# Patient Record
(demographics unavailable — no encounter records)

---

## 2024-10-09 NOTE — HISTORY OF PRESENT ILLNESS
[FreeTextEntry1] : KALEN MALDONADO is here for follow up. Since last visit she had updated MRI C spine. She is reporting neck pains and bilateral hand numbness. Does have hx of carpal tunnel syndrome. Wishing to go back to therapy for her symptoms as it was very helpful in the past.   Denies any new pain, numbness or weakness, bowel/bladder dysfunction, saddle anesthesia, fevers, chills, weight loss, night pain, or night sweats at this time.

## 2024-10-09 NOTE — ASSESSMENT
[FreeTextEntry1] : Ms. KALEN MALDONADO is a 87 year F with pain in the neck ACROSS with radiation down the arms due to cervical radiculopathy. She also has hx of myelopathy, and also spondylosis. Bilateral hand numbness due to CTS.  Patient reassured and educated on the diagnosis and treatment options. Risks and benefits of treatment and of delaying treatment discussed with patient. Risks discussed include but not limited to: progression of symptoms, worsening pain and functional status, etc.  This note was generated using Dragon medical dictation software. A reasonable effort had been made for proofreading its contents, but spelling mistakes or grammatical errors may still remain. If there are any questions or points of clarification needed please notify my office.  Chart reviewed Dr. Mae's note reviewed MRI C spine from 2022 reviewed  MRI w/o contrast of CERVICAL SPINE 2024 reviewed: "Multilevel discogenic degenerative disease and facet arthropathy of the cervical spine resulting in mild to moderate central canal and neural foraminal narrowing, as above. These findings are not significantly changed as compared to 6/29/2022."  "The partially evaluated aortic arch demonstrates enlargement, measuring at least 3 cm." Advising to follow up with PCP  Rx for OT and PT for CTS and neck pains Trigger point PRN  Follow up 4 weeks  Patient was advised if the following symptoms develop: chills, fever, loss of bladder control, bowel incontinence or urinary retention, numbness/tingling or weakness is present in upper or lower extremities, to go to the nearest emergency room. This may be a new clinical condition not present at the time of the patient visit that may lead to paralysis and/or death. Patient advised if the above symptoms developed to also call the office immediately to inform us and to go to the nearest emergency room.

## 2024-10-30 NOTE — HISTORY OF PRESENT ILLNESS
[FreeTextEntry1] : KALEN MALDONADO is here for follow up. Since last visit started PT and OT and notes improvement in overall symptoms. Asking to continue therapy. Also noting some point tenderness over the LEFT gluteal area. Pain is worse with laying on the side.   Denies any new pain, numbness or weakness, bowel/bladder dysfunction, saddle anesthesia, fevers, chills, weight loss, night pain, or night sweats at this time.

## 2024-10-30 NOTE — ASSESSMENT
[FreeTextEntry1] : Ms. KALEN MALDONADO is a 88 year F with pain in the neck ACROSS with radiation down the arms due to cervical radiculopathy. She also has hx of myelopathy, and also spondylosis. Bilateral hand numbness due to CTS. Today with left gluteal trigger points due to myofascial pain.  Patient reassured and educated on the diagnosis and treatment options. Risks and benefits of treatment and of delaying treatment discussed with patient. Risks discussed include but not limited to: progression of symptoms, worsening pain and functional status, etc.  This note was generated using Dragon medical dictation software. A reasonable effort had been made for proofreading its contents, but spelling mistakes or grammatical errors may still remain. If there are any questions or points of clarification needed please notify my office.   MRI w/o contrast of CERVICAL SPINE 2024: "Multilevel discogenic degenerative disease and facet arthropathy of the cervical spine resulting in mild to moderate central canal and neural foraminal narrowing, as above. These findings are not significantly changed as compared to 6/29/2022."  "The partially evaluated aortic arch demonstrates enlargement, measuring at least 3 cm." Advised last visit to follow up with PCP  Rx for OT and PT for CTS and neck pains Trigger point PRN Rx for voltaren gel  Start Medrol 4mg dose pack #21 tablets. Patient directed to follow directions on the blister pack and to complete the entire treatment course. Advised not to take any NSAIDs during of treatment. Denies CKD, CAD, or gastritis. Recommend that if patient develops GI symptoms including abdominal pain, nausea, or vomiting to discontinue use of medication immediately.  Follow up 4 weeks  Patient was advised if the following symptoms develop: chills, fever, loss of bladder control, bowel incontinence or urinary retention, numbness/tingling or weakness is present in upper or lower extremities, to go to the nearest emergency room. This may be a new clinical condition not present at the time of the patient visit that may lead to paralysis and/or death. Patient advised if the above symptoms developed to also call the office immediately to inform us and to go to the nearest emergency room.

## 2024-10-30 NOTE — HISTORY OF PRESENT ILLNESS
[FreeTextEntry1] : KALEN MALDONADO is here for follow up. Since last visit started PT and OT and notes improvement in overall symptoms. Asking to continue therapy. Also noting some point tenderness over the LEFT gluteal area. Pain is worse with laying on the side.   Denies any new pain, numbness or weakness, bowel/bladder dysfunction, saddle anesthesia, fevers, chills, weight loss, night pain, or night sweats at this time. Finger fracture, right, closed, initial encounter

## 2024-10-30 NOTE — PHYSICAL EXAM
[FreeTextEntry1] : General exam   Constitutional: The patient appears well-developed, well-nourished, and in no apparent distress. Patient is well-groomed.    Skin: The skin is warm and dry, with normal turgor.  Eyes: PERRL.    ENMT: Ears: Hearing is grossly within normal limits.    Neck: Supple: The neck is supple.    Respiratory: Inspection: Breathing unlabored.    Neurologic: Alert and oriented x 3.   Psychiatric: Patient is cooperative and appropriate.  Mood and affect are normal.  Patient's insight is good, and memory and judgment are intact.  Lumbar Skin c/d/i without any erythema, swelling, effusion  Point tenderness over LEFT gluteal area Multiple trigger points

## 2025-04-07 NOTE — ADDENDUM
[FreeTextEntry1] : Documented by Melanie Maki acting as a scribe for Dr. Sosa Gómez. 04/07/2025  All medical records entries made by the scribe were at my, Dr. Gómez, direction and personally dictated by me on 04/07/2025. I have reviewed the chart and agree that the record accurately reflects my personal performance of the history, physical exam, assessment and plan. I have also personally directed, reviewed, and agreed with the chart.

## 2025-04-07 NOTE — ASSESSMENT
[Vaccines Reviewed] : Immunizations reviewed today. Please see immunization details in the vaccine log within the immunization flowsheet.  [FreeTextEntry1] : annual physical  referred for mammogram, bone density scan  urinary incontinence we'll check UA and urine culture today referred to urology  Forgetful referred to neurology on last visit  Osteoarthritis, chronic neck and back pain (cervical myelopathy/myopathy and stenosis) cont Gabapentin 100mg qhs prn following with ortho referred to PM&R  Hx of anxiety and depression start Escitalopram 5mg daily - renewed today  Elevated cholesterol Discussed the importance of following a low cholesterol/low fat diet/exercise we'll check lipids and LFT's today   bloodwork ordered follow up in one week for lab results

## 2025-04-07 NOTE — HISTORY OF PRESENT ILLNESS
[de-identified] :  Ms. KALEN MALDONADO is a 88 year old female with hx of breast CA, osteoarthritis, hyperparathyroidism, chronic neck and back pain, and anxiety, presenting for a follow up visit.  Pt c/o bladder incontinence. Pt c/o ankle swelling at night. Denies any SOB, CP, abdominal pain, N/V/D, headache, dizziness, or leg swelling.  Reports compliance with taking her meds daily.

## 2025-04-07 NOTE — HEALTH RISK ASSESSMENT
[Fair] :  ~his/her~ mood as fair [No] : No [No falls in past year] : Patient reported no falls in the past year [0] : 1) Little interest or pleasure doing things: Not at all (0) [1] : 2) Feeling down, depressed, or hopeless for several days (1) [PHQ-2 Negative - No further assessment needed] : PHQ-2 Negative - No further assessment needed [Yes] : takes [Never] : Never [NO] : No [Patient reported mammogram was normal] : Patient reported mammogram was normal [Patient reported colonoscopy was normal] : Patient reported colonoscopy was normal [With Family] : lives with family [# of Members in Household ___] :  household currently consist of [unfilled] member(s) [Retired] : retired [High School] : high school [] :  [Feels Safe at Home] : Feels safe at home [Independent] : managing finances [Some assistance needed] : preparing meals [Full assistance needed] : housekeeping [Smoke Detector] : smoke detector [Carbon Monoxide Detector] : carbon monoxide detector [Safety elements used in home] : safety elements used in home [Seat Belt] :  uses seat belt [Sunscreen] : uses sunscreen [de-identified] : Maintains active by going in the pool daily. [de-identified] : Maintains a healthy balanced diet.  [KEY2Umovf] : 1 [Sexually Active] : not sexually active [Reports changes in hearing] : Reports no changes in hearing [Reports changes in vision] : Reports no changes in vision [Reports changes in dental health] : Reports no changes in dental health [Travel to Developing Areas] : does not  travel to developing areas [TB Exposure] : is not being exposed to tuberculosis [Caregiver Concerns] : does not have caregiver concerns [MammogramDate] : 07/2022 [BoneDensityDate] : "a while ago" [ColonoscopyDate] : 08/2019 [de-identified] : Dx. with Macular degeneration 2-3 yrs ago, takes eye drops and vitamins for the past 2-3 years.

## 2025-04-07 NOTE — REVIEW OF SYSTEMS
[Lower Ext Edema] : lower extremity edema [Incontinence] : incontinence [Back Pain] : back pain [Negative] : Heme/Lymph [FreeTextEntry9] : chronic neck and back pain [de-identified] : forgetfulness

## 2025-04-07 NOTE — PHYSICAL EXAM
[No Acute Distress] : no acute distress [Well-Appearing] : well-appearing [Normal Sclera/Conjunctiva] : normal sclera/conjunctiva [PERRL] : pupils equal round and reactive to light [EOMI] : extraocular movements intact [Normal Outer Ear/Nose] : the outer ears and nose were normal in appearance [Normal Oropharynx] : the oropharynx was normal [No JVD] : no jugular venous distention [No Lymphadenopathy] : no lymphadenopathy [Supple] : supple [No Respiratory Distress] : no respiratory distress  [No Accessory Muscle Use] : no accessory muscle use [Clear to Auscultation] : lungs were clear to auscultation bilaterally [Normal Rate] : normal rate  [Regular Rhythm] : with a regular rhythm [Normal S1, S2] : normal S1 and S2 [No Murmur] : no murmur heard [No Edema] : there was no peripheral edema [Soft] : abdomen soft [Non Tender] : non-tender [Non-distended] : non-distended [No Masses] : no abdominal mass palpated [No HSM] : no HSM [Normal Bowel Sounds] : normal bowel sounds [Normal Posterior Cervical Nodes] : no posterior cervical lymphadenopathy [Normal Anterior Cervical Nodes] : no anterior cervical lymphadenopathy [No CVA Tenderness] : no CVA  tenderness [No Spinal Tenderness] : no spinal tenderness [No Joint Swelling] : no joint swelling [No Rash] : no rash [Normal Affect] : the affect was normal [Normal Insight/Judgement] : insight and judgment were intact [de-identified] : + chronic neck pain [de-identified] : + chronic neck and back pain [de-identified] :  ambulates with walker

## 2025-05-06 NOTE — HISTORY OF PRESENT ILLNESS
[de-identified] : 88-year-old female presents today with left shoulder pain x 3 weeks. No injury reported. The pain is constant and worse with movement of the shoulder. She takes Tylenol with some relief. She also complains of radiating pain to the hand. Patient also c/o loss of motion. Patient uses walker for ambulation.  s/p right shoulder arthroplasty in 2012.

## 2025-05-06 NOTE — HISTORY OF PRESENT ILLNESS
[de-identified] : 88-year-old female presents today with left shoulder pain x 3 weeks. No injury reported. The pain is constant and worse with movement of the shoulder. She takes Tylenol with some relief. She also complains of radiating pain to the hand. Patient also c/o loss of motion. Patient uses walker for ambulation.  s/p right shoulder arthroplasty in 2012.

## 2025-05-06 NOTE — ADDENDUM
[FreeTextEntry1] : This note was written by Evelyn Nation on 04/29/2025 acting solely as a scribe for Dr. Shailesh Goodson.   All medical record entries made by the Scribe were at my, Dr. Shailesh Goodson, direction and personally dictated by me on 04/29/2025. I have personally reviewed the chart and agree that the record accurately reflects my personal performance of the history, physical exam, assessment and plan.

## 2025-05-06 NOTE — DISCUSSION/SUMMARY
[de-identified] : 89 y/o female with left shoulder OA  A discussion was had with the patient regarding degenerative joint disease that results from rotator cuff injury and loss of joint congruence and glenohumeral wear. This is consistent with rotator cuff arthropathy which is characterized by the combination of rotator cuff insufficiency as well as glenohumeral cartilage destruction and superior migration of the humeral head. Discussed that progression is common due to the loss of the compressive effects of the rotator cuff as well as progression of functional decline. Rotator cuff arthropathy tends to limit range of motion as well as pseudoparalysis. Given age of patient and relative recent onset of pain from chronic condition, recommendation is for conservative management. Patient is not a current surgical candidate for TSA.   Recommendation: Begin trial of PT, Rx given. OTC NSAIDs or acetaminophen as tolerated, with application of ice/heat to the area 2-3x daily for 20 minutes after periods of activity.       Follow-up as needed.

## 2025-05-06 NOTE — PHYSICAL EXAM
[de-identified] : Left shoulder exam  Inspection: No malalignment, No defects, No atrophy Skin: No masses, No lesions Neck: Negative Spurling's, full ROM, no pain with ROM AROM: FF to 120, abduction to 80, ER limited, IR to hip Painful arc ROM: none Tenderness: no bicipital tenderness, no tenderness to the greater tuberosity/RTC insertion, no anterior shoulder/lesser tuberosity tenderness Strength: 3/5 ER, 5/5 IR in adduction, 3/5 supraspinatus testing, negative Oxford's test AC Joint: No ttp/pain with cross arm testing Biceps: Speed Negative, Yergusons Negative Impingement test: Negative Aponte, Negative Neer  Stability: Stable Vasc: 2+ radial pulse Neuro: AIN, PIN, Ulnar nerve intact to motor Sensation: Intact to light touch throughout  [de-identified] : The following radiographs were ordered and read by me during this patients visit. I reviewed each radiograph in detail with the patient and discussed the findings as highlighted below.   3 views of the left shoulder were obtained, 04/29/2025, that show no acute fracture or dislocation. There is severe glenohumeral and moderate AC joint degenerative change seen. Type II acromion. There is high riding humeral head. No significant other obvious osseous abnormality, otherwise unremarkable.

## 2025-05-06 NOTE — PHYSICAL EXAM
[de-identified] : Left shoulder exam  Inspection: No malalignment, No defects, No atrophy Skin: No masses, No lesions Neck: Negative Spurling's, full ROM, no pain with ROM AROM: FF to 120, abduction to 80, ER limited, IR to hip Painful arc ROM: none Tenderness: no bicipital tenderness, no tenderness to the greater tuberosity/RTC insertion, no anterior shoulder/lesser tuberosity tenderness Strength: 3/5 ER, 5/5 IR in adduction, 3/5 supraspinatus testing, negative Cannon's test AC Joint: No ttp/pain with cross arm testing Biceps: Speed Negative, Yergusons Negative Impingement test: Negative Aponte, Negative Neer  Stability: Stable Vasc: 2+ radial pulse Neuro: AIN, PIN, Ulnar nerve intact to motor Sensation: Intact to light touch throughout  [de-identified] : The following radiographs were ordered and read by me during this patients visit. I reviewed each radiograph in detail with the patient and discussed the findings as highlighted below.   3 views of the left shoulder were obtained, 04/29/2025, that show no acute fracture or dislocation. There is severe glenohumeral and moderate AC joint degenerative change seen. Type II acromion. There is high riding humeral head. No significant other obvious osseous abnormality, otherwise unremarkable.

## 2025-05-06 NOTE — DISCUSSION/SUMMARY
[de-identified] : 87 y/o female with left shoulder OA  A discussion was had with the patient regarding degenerative joint disease that results from rotator cuff injury and loss of joint congruence and glenohumeral wear. This is consistent with rotator cuff arthropathy which is characterized by the combination of rotator cuff insufficiency as well as glenohumeral cartilage destruction and superior migration of the humeral head. Discussed that progression is common due to the loss of the compressive effects of the rotator cuff as well as progression of functional decline. Rotator cuff arthropathy tends to limit range of motion as well as pseudoparalysis. Given age of patient and relative recent onset of pain from chronic condition, recommendation is for conservative management. Patient is not a current surgical candidate for TSA.   Recommendation: Begin trial of PT, Rx given. OTC NSAIDs or acetaminophen as tolerated, with application of ice/heat to the area 2-3x daily for 20 minutes after periods of activity.       Follow-up as needed.

## 2025-05-22 NOTE — PHYSICAL EXAM
[No Acute Distress] : no acute distress [Well-Appearing] : well-appearing [Normal Sclera/Conjunctiva] : normal sclera/conjunctiva [Normal Outer Ear/Nose] : the outer ears and nose were normal in appearance [No JVD] : no jugular venous distention [No Lymphadenopathy] : no lymphadenopathy [Supple] : supple [No Respiratory Distress] : no respiratory distress  [No Accessory Muscle Use] : no accessory muscle use [Clear to Auscultation] : lungs were clear to auscultation bilaterally [Normal Rate] : normal rate  [Regular Rhythm] : with a regular rhythm [Normal S1, S2] : normal S1 and S2 [No Murmur] : no murmur heard [No Edema] : there was no peripheral edema [No Extremity Clubbing/Cyanosis] : no extremity clubbing/cyanosis [Soft] : abdomen soft [Non Tender] : non-tender [Non-distended] : non-distended [Normal Posterior Cervical Nodes] : no posterior cervical lymphadenopathy [Normal Anterior Cervical Nodes] : no anterior cervical lymphadenopathy [No CVA Tenderness] : no CVA  tenderness [No Joint Swelling] : no joint swelling [No Rash] : no rash [Normal Affect] : the affect was normal [Alert and Oriented x3] : oriented to person, place, and time [Normal Insight/Judgement] : insight and judgment were intact [de-identified] : +b/l cerumen impaction, right > left [de-identified] : + chronic neck pain [de-identified] : + chronic neck and back pain [de-identified] :  ambulates with walker

## 2025-05-22 NOTE — HISTORY OF PRESENT ILLNESS
[de-identified] : Ms. KALEN MALDONADO is a 88 year old female with hx of breast CA, osteoarthritis, hyperparathyroidism, chronic neck and back pain, and anxiety, presenting for a follow up visit.   Pt states she is feeling well. Pt c/o itchiness in her left ear. Denies any SOB, CP, abdominal pain, N/V/D, headache, dizziness, or leg swelling.   Reports compliance with taking her meds daily.

## 2025-05-22 NOTE — PHYSICAL EXAM
[No Acute Distress] : no acute distress [Well-Appearing] : well-appearing [Normal Sclera/Conjunctiva] : normal sclera/conjunctiva [Normal Outer Ear/Nose] : the outer ears and nose were normal in appearance [No JVD] : no jugular venous distention [No Lymphadenopathy] : no lymphadenopathy [Supple] : supple [No Respiratory Distress] : no respiratory distress  [No Accessory Muscle Use] : no accessory muscle use [Clear to Auscultation] : lungs were clear to auscultation bilaterally [Normal Rate] : normal rate  [Regular Rhythm] : with a regular rhythm [Normal S1, S2] : normal S1 and S2 [No Murmur] : no murmur heard [No Edema] : there was no peripheral edema [No Extremity Clubbing/Cyanosis] : no extremity clubbing/cyanosis [Soft] : abdomen soft [Non Tender] : non-tender [Non-distended] : non-distended [Normal Posterior Cervical Nodes] : no posterior cervical lymphadenopathy [Normal Anterior Cervical Nodes] : no anterior cervical lymphadenopathy [No CVA Tenderness] : no CVA  tenderness [No Joint Swelling] : no joint swelling [No Rash] : no rash [Normal Affect] : the affect was normal [Alert and Oriented x3] : oriented to person, place, and time [Normal Insight/Judgement] : insight and judgment were intact [de-identified] : +b/l cerumen impaction, right > left [de-identified] : + chronic neck pain [de-identified] : + chronic neck and back pain [de-identified] :  ambulates with walker

## 2025-05-22 NOTE — REVIEW OF SYSTEMS
[Back Pain] : back pain [Negative] : Heme/Lymph [FreeTextEntry9] : chronic neck and back pain [de-identified] : forgetfulness

## 2025-05-22 NOTE — ASSESSMENT
[Vaccines Reviewed] : Immunizations reviewed today. Please see immunization details in the vaccine log within the immunization flowsheet.  [FreeTextEntry1] : follow up  Forgetful referred to neurology on last visit but hasn't been able to contact them - referral given today  Osteoarthritis, chronic neck and back pain (cervical myelopathy/myopathy and stenosis) cont Gabapentin 100mg qhs prn following with ortho following with PM&R  Hx of anxiety and depression dc Escitalopram 5mg daily start paroxetine 10mg daily - Rx sent to pharmacy  Elevated cholesterol Discussed the importance of following a low cholesterol/low fat diet/exercise we'll check lipids today   bloodwork ordered follow up in one week for lab results

## 2025-05-22 NOTE — ADDENDUM
[FreeTextEntry1] : Documented by Melanie Maki acting as a scribe for Dr. Sosa Gómez. 05/22/2025  All medical records entries made by the scribe were at my, Dr. Gómez, direction and personally dictated by me on 05/22/2025. I have reviewed the chart and agree that the record accurately reflects my personal performance of the history, physical exam, assessment and plan. I have also personally directed, reviewed, and agreed with the chart.

## 2025-05-22 NOTE — HISTORY OF PRESENT ILLNESS
[de-identified] : Ms. KALEN MALDONADO is a 88 year old female with hx of breast CA, osteoarthritis, hyperparathyroidism, chronic neck and back pain, and anxiety, presenting for a follow up visit.   Pt states she is feeling well. Pt c/o itchiness in her left ear. Denies any SOB, CP, abdominal pain, N/V/D, headache, dizziness, or leg swelling.   Reports compliance with taking her meds daily.

## 2025-05-22 NOTE — REVIEW OF SYSTEMS
[Back Pain] : back pain [Negative] : Heme/Lymph [FreeTextEntry9] : chronic neck and back pain [de-identified] : forgetfulness

## 2025-06-02 NOTE — HISTORY OF PRESENT ILLNESS
[FreeTextEntry1] : Very pleasant  88 year old woman who presents for evaluation of urinary urgency, urge incontinence.  She reports that she has been taking an over-the-counter supplement with improvement in her symptoms, however this does not significantly improve her symptoms then she also reports that the cost of the supplements is very expensive.  She wishes to discuss additional options for management of urinary urgency at this time.  She denies leakage of urine with coughing, sneezing, or straining.  Reports a "bladder lift" 55 years ago

## 2025-06-02 NOTE — ASSESSMENT
[FreeTextEntry1] : Very pleasant 88-year-old woman who presents for evaluation of urinary urgency, urge incontinence - We discussed different types of incontinence, including urinary urgency, urge incontinence, and stress incontinence - We discussed different treatment options for urinary urgency and urge incontinence - Trial of mirabegron - We discussed the risks and benefits of mirabegron, including the need to check blood pressure more frequently when for starting the medication -A1c 5.8% -Creatinine 0.81 -WBC 6.21 - Follow-up in 1 month  Patient is being seen today for evaluation and management of a chronic and longitudinal ongoing condition and I am the primary treating physician

## 2025-06-30 NOTE — HISTORY OF PRESENT ILLNESS
[Home] : at home, [unfilled] , at the time of the visit. [Medical Office: ([facility])___] : at the medical office located in  [Telephone (audio)] : This telephonic visit was provided via audio only technology. [Technical] : patient unable to effectively utilize tele-video due to technical issues. [Verbal consent obtained from patient] : the patient, [unfilled] [Telehealth (audio & video)] : This visit was provided via telehealth using real-time 2-way audio visual technology. [de-identified] : Ms. KALEN MALDONADO is a 88 year old female with hx of breast CA, osteoarthritis, hyperparathyroidism, chronic neck and back pain, seen in telemedicine for a follow up visit.   Pt c/o left shoulder pain and limited ROM of left shoulder that started 2 weeks ago. Pt has been taking ibuprofen which has not helped. Denies any SOB, CP, abdominal pain, N/V/D, headache, dizziness, or leg swelling. Reports compliance with taking her meds daily.

## 2025-06-30 NOTE — ASSESSMENT
[Vaccines Reviewed] : Immunizations reviewed today. Please see immunization details in the vaccine log within the immunization flowsheet.  [FreeTextEntry1] : follow up  rt shoulder pain Diclofenac to 75 mg twice daily with food-Rx sent to pharmacy Voltaren gel prn-Rx sent to pharmacy f/u with Ortho  Osteoarthritis, chronic neck and back pain (cervical myelopathy/myopathy and stenosis) cont Gabapentin 100mg qhs prn following with ortho following with PM&R  hx  of urinary incontinence following  with urology --has an appointment on July 7th.  Hx of anxiety and depression cont paroxetine 10mg daily   Elevated cholesterol Reinforced the importance of following a low fat/low cholesterol diet

## 2025-06-30 NOTE — ADDENDUM
[FreeTextEntry1] : Documented by Melanie Maki acting as a scribe for Dr. Sosa Gómez. 06/30/2025  All medical records entries made by the scribe were at my, Dr. Gómez, direction and personally dictated by me on 06/30/2025. I have reviewed the chart and agree that the record accurately reflects my personal performance of the history, physical exam, assessment and plan. I have also personally directed, reviewed, and agreed with the chart.

## 2025-07-07 NOTE — HISTORY OF PRESENT ILLNESS
[FreeTextEntry1] : Very pleasant  88 year old woman who presents for follow-up of urinary urgency, urge incontinence.  She reports that she has been taking an over-the-counter supplement with improvement in her symptoms, however reports that this did not significantly improve her urinary symptoms.  Because of this she was started on mirabegron at last visit. This was cost-prohobitive and she therefore did not fill the medication. She is interested in trying a different medication at this time.     Reports a "bladder lift" 55 years ago

## 2025-07-07 NOTE — ASSESSMENT
[FreeTextEntry1] : Very pleasant 88-year-old woman who presents for follow-up of urinary urgency, urge incontinence -Trial of Gemtesa -Discussed R/B/A of Gemtesa -A1c 5.8% -Creatinine 0.81 -WBC 6.21 - Follow-up in 1 month  Patient is being seen today for evaluation and management of a chronic and longitudinal ongoing condition and I am the primary treating physician